# Patient Record
Sex: MALE | Race: WHITE | NOT HISPANIC OR LATINO | Employment: FULL TIME | URBAN - METROPOLITAN AREA
[De-identification: names, ages, dates, MRNs, and addresses within clinical notes are randomized per-mention and may not be internally consistent; named-entity substitution may affect disease eponyms.]

---

## 2020-01-01 ENCOUNTER — OUTSIDE FACILITY SERVICE (OUTPATIENT)
Dept: CARDIOLOGY | Facility: CLINIC | Age: 60
End: 2020-01-01

## 2020-01-01 ENCOUNTER — TELEPHONE (OUTPATIENT)
Dept: CARDIOLOGY | Facility: CLINIC | Age: 60
End: 2020-01-01

## 2020-01-01 ENCOUNTER — OFFICE VISIT (OUTPATIENT)
Dept: CARDIAC SURGERY | Facility: CLINIC | Age: 60
End: 2020-01-01

## 2020-01-01 ENCOUNTER — OFFICE VISIT (OUTPATIENT)
Dept: CARDIOLOGY | Facility: CLINIC | Age: 60
End: 2020-01-01

## 2020-01-01 VITALS
OXYGEN SATURATION: 97 % | SYSTOLIC BLOOD PRESSURE: 114 MMHG | HEART RATE: 84 BPM | WEIGHT: 242 LBS | DIASTOLIC BLOOD PRESSURE: 82 MMHG

## 2020-01-01 VITALS
HEART RATE: 86 BPM | TEMPERATURE: 97.6 F | DIASTOLIC BLOOD PRESSURE: 88 MMHG | BODY MASS INDEX: 34.99 KG/M2 | OXYGEN SATURATION: 96 % | HEIGHT: 70 IN | RESPIRATION RATE: 20 BRPM | SYSTOLIC BLOOD PRESSURE: 142 MMHG | WEIGHT: 244.4 LBS

## 2020-01-01 DIAGNOSIS — I20.8 ANGINA OF EFFORT (HCC): ICD-10-CM

## 2020-01-01 DIAGNOSIS — I48.11 LONGSTANDING PERSISTENT ATRIAL FIBRILLATION (HCC): ICD-10-CM

## 2020-01-01 DIAGNOSIS — I25.10 CAD, MULTIPLE VESSEL: Primary | ICD-10-CM

## 2020-01-01 DIAGNOSIS — I51.9 LEFT VENTRICULAR DYSFUNCTION: ICD-10-CM

## 2020-01-01 DIAGNOSIS — I10 ESSENTIAL HYPERTENSION: Primary | ICD-10-CM

## 2020-01-01 DIAGNOSIS — I48.19 PERSISTENT ATRIAL FIBRILLATION (HCC): ICD-10-CM

## 2020-01-01 DIAGNOSIS — I25.118 CORONARY ARTERY DISEASE OF NATIVE ARTERY OF NATIVE HEART WITH STABLE ANGINA PECTORIS (HCC): ICD-10-CM

## 2020-01-01 DIAGNOSIS — I25.5 ISCHEMIC CARDIOMYOPATHY: ICD-10-CM

## 2020-01-01 DIAGNOSIS — E78.2 MIXED HYPERLIPIDEMIA: ICD-10-CM

## 2020-01-01 PROCEDURE — 99254 IP/OBS CNSLTJ NEW/EST MOD 60: CPT | Performed by: INTERNAL MEDICINE

## 2020-01-01 PROCEDURE — 99232 SBSQ HOSP IP/OBS MODERATE 35: CPT | Performed by: INTERNAL MEDICINE

## 2020-01-01 PROCEDURE — 93000 ELECTROCARDIOGRAM COMPLETE: CPT | Performed by: INTERNAL MEDICINE

## 2020-01-01 PROCEDURE — 99204 OFFICE O/P NEW MOD 45 MIN: CPT | Performed by: THORACIC SURGERY (CARDIOTHORACIC VASCULAR SURGERY)

## 2020-01-01 PROCEDURE — 99214 OFFICE O/P EST MOD 30 MIN: CPT | Performed by: INTERNAL MEDICINE

## 2020-01-01 RX ORDER — NITROGLYCERIN 0.4 MG/1
TABLET SUBLINGUAL
Qty: 30 TABLET | Refills: 2 | Status: SHIPPED | OUTPATIENT
Start: 2020-01-01

## 2020-01-01 RX ORDER — METOPROLOL TARTRATE 50 MG/1
TABLET, FILM COATED ORAL
COMMUNITY
Start: 2020-01-01

## 2020-01-01 RX ORDER — DAPAGLIFLOZIN AND METFORMIN HYDROCHLORIDE 5; 1000 MG/1; MG/1
TABLET, FILM COATED, EXTENDED RELEASE ORAL
COMMUNITY
Start: 2020-01-01

## 2020-01-01 RX ORDER — HYDRALAZINE HYDROCHLORIDE 25 MG/1
TABLET, FILM COATED ORAL
COMMUNITY
Start: 2020-01-01

## 2020-01-01 RX ORDER — ISOSORBIDE MONONITRATE 30 MG/1
30 TABLET, EXTENDED RELEASE ORAL DAILY
Qty: 30 TABLET | Refills: 2 | Status: SHIPPED | OUTPATIENT
Start: 2020-01-01

## 2020-01-01 RX ORDER — DILTIAZEM HYDROCHLORIDE 240 MG/1
CAPSULE, EXTENDED RELEASE ORAL
COMMUNITY
Start: 2020-01-01

## 2020-01-01 RX ORDER — ASPIRIN 81 MG/1
81 TABLET, CHEWABLE ORAL DAILY
COMMUNITY

## 2020-01-01 RX ORDER — DOXAZOSIN MESYLATE 4 MG/1
TABLET ORAL
COMMUNITY
Start: 2020-01-01

## 2020-01-01 RX ORDER — APIXABAN 5 MG/1
TABLET, FILM COATED ORAL
COMMUNITY
Start: 2020-01-01

## 2020-02-11 NOTE — PROGRESS NOTES
Cardiology Office Visit      Encounter Date:  02/11/2020    Patient ID:   Evelin Villaseñor is a 59 y.o. male.    Reason For Followup:  Atrial fibrillation  Hypertension  Hyperlipidemia  Cardiomyopathy  Coronary artery disease    Brief Clinical History:  Dear  Provider, No Known    I had the pleasure of seeing Evelin Villaseñor today. As you are well aware, this is a 59 y.o. male with a past medical history that is significant for history of hypertension hyperlipidemia diabetes was recently hospitalized at [facility] for symptoms of atrial fibrillation with rapid ventricular response patient during the work-up noted to have a cardiomyopathy with wall motion abnormalities cardiac catheterization showed severe three-vessel coronary artery disease with prior myocardial infarction involving the LAD    Cardiac catheterization showed severe multivessel coronary artery disease mostly small vessel disease chronic total occlusion of the LAD with a left to left and right-to-left collaterals and also significant obstructive disease involving the circumflex and right coronary artery    Patient is currently on medical therapy for atrial fibrillation and also cardiomyopathy      Interval History:  Patient still has some intermittent chest discomfort and has been compliant with medical therapy    Assessment & Plan    Impressions:  Atrial fibrillation  Hypertension  Hyperlipidemia  Cardiomyopathy  Coronary artery disease    Recommendations:  Plan to add Imdur 30 mg p.o. once a day  Discontinue hydralazine  Continue current dose of metoprolol and Cardizem for rate control  Continue antiplatelet and anticoagulation therapy  Patient is scheduled to be seen and evaluated by cardiothoracic surgery this Thursday  Plans to consider coronary artery bypass surgery with maze procedure and possible ligation of the left atrial appendage  Risk benefits and alternatives and further treatment options discussed with the patient and  family  Further recommendations based on input from surgical team  Follow-up in office in 1 month    Objective:    Vitals:  Vitals:    02/11/20 0858   BP: 114/82   Pulse: 84   SpO2: 97%   Weight: 110 kg (242 lb)       Physical Exam:    General: Alert, cooperative, no distress, appears stated age  Head:  Normocephalic, atraumatic, mucous membranes moist  Eyes:  Conjunctiva/corneas clear, EOM's intact     Neck:  Supple,  no adenopathy;      Lungs: Clear to auscultation bilaterally, no wheezes rhonchi rales are noted  Chest wall: No tenderness  Heart::  Regular rate and rhythm, S1 and S2 normal, no murmur, rub or gallop  Abdomen: Soft, non-tender, nondistended bowel sounds active  Extremities: No cyanosis, clubbing, or edema  Pulses: 2+ and symmetric all extremities  Skin:  No rashes or lesions  Neuro/psych: A&O x3. CN II through XII are grossly intact with appropriate affect      Allergies:  No Known Allergies    Medication Review:     Current Outpatient Medications:   •  aspirin 81 MG chewable tablet, Chew 81 mg Daily., Disp: , Rfl:   •  dilTIAZem (TIAZAC) 240 MG 24 hr capsule, , Disp: , Rfl:   •  doxazosin (CARDURA) 4 MG tablet, , Disp: , Rfl:   •  ELIQUIS 5 MG tablet tablet, , Disp: , Rfl:   •  hydrALAZINE (APRESOLINE) 25 MG tablet, , Disp: , Rfl:   •  insulin aspart (NOVOLOG) 100 UNIT/ML injection, Inject  under the skin into the appropriate area as directed 3 (Three) Times a Day Before Meals., Disp: , Rfl:   •  metoprolol tartrate (LOPRESSOR) 50 MG tablet, , Disp: , Rfl:   •  Semaglutide (RYBELSUS) 14 MG tablet, Take  by mouth. q day, Disp: , Rfl:   •  XIGDUO XR 5-1000 MG tablet, , Disp: , Rfl:     Family History:  Family History   Problem Relation Age of Onset   • Heart disease Mother        Past Medical History:  Past Medical History:   Diagnosis Date   • Diabetes mellitus (CMS/HCC)    • Hypertension        Past surgical History:  Past Surgical History:   Procedure Laterality Date   • CARDIAC CATHETERIZATION      • KNEE SURGERY     • SHOULDER SURGERY     • VEIN SURGERY         Social History:  Social History     Socioeconomic History   • Marital status:      Spouse name: Not on file   • Number of children: Not on file   • Years of education: Not on file   • Highest education level: Not on file   Tobacco Use   • Smoking status: Never Smoker   • Smokeless tobacco: Never Used   Substance and Sexual Activity   • Alcohol use: Yes     Comment: weekly    • Drug use: Never       Review of Systems:  The following systems were reviewed as they relate to the cardiovascular system: Constitutional, Eyes, ENT, Cardiovascular, Respiratory, Gastrointestinal, Integumentary, Neurological, Psychiatric, Hematologic, Endocrine, Musculoskeletal, and Genitourinary. The pertinent cardiovascular findings are reported above with all other cardiovascular points within those systems being negative.    Diagnostic Study Review:     Current Electrocardiogram:    ECG 12 Lead  Date/Time: 2/11/2020 5:50 PM  Performed by: Brian Rodriguez MD  Authorized by: Brian Rodriguez MD   Comparison: compared with previous ECG   Similar to previous ECG  Rhythm: atrial fibrillation  Rate: normal  BPM: 84  Conduction: non-specific intraventricular conduction delay  QRS axis: normal  Other findings: non-specific ST-T wave changes    Clinical impression: abnormal EKG              NOTE: The following portions of the patient's history were reviewed and updated this visit as appropriate: allergies, current medications, past family history, past medical history, past social history, past surgical history and problem list.

## 2020-02-16 PROBLEM — I20.8 ANGINA OF EFFORT (HCC): Status: ACTIVE | Noted: 2020-01-01

## 2020-02-16 PROBLEM — I48.19 PERSISTENT ATRIAL FIBRILLATION (HCC): Status: ACTIVE | Noted: 2020-01-01

## 2020-02-16 PROBLEM — I25.10 CAD, MULTIPLE VESSEL: Status: ACTIVE | Noted: 2020-01-01

## 2020-02-16 PROBLEM — I51.9 LEFT VENTRICULAR DYSFUNCTION: Status: ACTIVE | Noted: 2020-01-01

## 2020-02-16 NOTE — PROGRESS NOTES
2/16/2020    Seen on 2/13/20    Chief Complaint     Angina pectoris    History of Present Illness:       Dear Dr. Rodriguez and Colleagues,  It was nice to see Evelin SANTANA Kasi in consultation at your request. He is a 59 y.o. male with a history of hypertension, diabetes, osteoarthritis, status post left knee replacement, and recent admission for new onset of atrial fibrillation and progressive chest pain.  He had no history of coronary artery disease until recently when he was admitted with midsternal chest pain and new onset of atrial fibrillation.  Chest pain was substernal, central chest and radiated to the upper abdomen.  He was dull and it was associated with pressure and tightness.  He will need 30 and he consulted the emergency room because his heart rate was in the 130s.  He was treated with diltiazem with good improvement however is not clear what he was converted.  He had degree of congestive heart failure and he was treated appropriately.  His creatinine level was slightly elevated.  He was placed on Eliquis and after the stabilization eventually went home.  His chest pain has gotten better since he is taking nitroglycerin orally.  While he exerts, he still has low level angina . He denies dyspnea, orthopnea, PND, lower extremity edema, TIA or stroke.  He had mild fatigue and dyspnea associated with chest discomfort or palpitations.  He had a cardiac cath that showed severe diffuse three-vessel coronary artery disease with mild depression in the LV function, approximately 45%.  His LAD is no bypassable and the diagonals are small in size.  Multiple tandem lesions.  There are 2 OM coronaries, 1 of them small that is probably bypassable the other one is too small.  The RCA has a proximal lesion and he has distal disease.  He is unclear what he has family history of coronary artery disease.  He had an echocardiogram that showed an ejection fraction 45% with mild to moderate inferior wall hypokinesis.  There is  no mitral regurgitation..      Patient Active Problem List   Diagnosis   • CAD, multiple vessel   • Angina of effort (CMS/HCC)   • Left ventricular dysfunction   • Persistent atrial fibrillation       Past Medical History:   Diagnosis Date   • Diabetes mellitus (CMS/HCC)    • Hypertension        Past Surgical History:   Procedure Laterality Date   • CARDIAC CATHETERIZATION     • KNEE SURGERY     • SHOULDER SURGERY     • VEIN SURGERY         No Known Allergies      Current Outpatient Medications:   •  aspirin 81 MG chewable tablet, Chew 81 mg Daily., Disp: , Rfl:   •  dilTIAZem (TIAZAC) 240 MG 24 hr capsule, , Disp: , Rfl:   •  doxazosin (CARDURA) 4 MG tablet, , Disp: , Rfl:   •  ELIQUIS 5 MG tablet tablet, , Disp: , Rfl:   •  hydrALAZINE (APRESOLINE) 25 MG tablet, , Disp: , Rfl:   •  insulin aspart (NOVOLOG) 100 UNIT/ML injection, Inject  under the skin into the appropriate area as directed 3 (Three) Times a Day Before Meals., Disp: , Rfl:   •  isosorbide mononitrate (IMDUR) 30 MG 24 hr tablet, Take 1 tablet by mouth Daily., Disp: 30 tablet, Rfl: 2  •  metoprolol tartrate (LOPRESSOR) 50 MG tablet, , Disp: , Rfl:   •  nitroglycerin (NITROSTAT) 0.4 MG SL tablet, 1 under the tongue as needed for angina, may repeat q5mins for up three doses, Disp: 30 tablet, Rfl: 2  •  Semaglutide (RYBELSUS) 14 MG tablet, Take  by mouth. q day, Disp: , Rfl:   •  XIGDUO XR 5-1000 MG tablet, , Disp: , Rfl:     Social History     Socioeconomic History   • Marital status:      Spouse name: Not on file   • Number of children: Not on file   • Years of education: Not on file   • Highest education level: Not on file   Tobacco Use   • Smoking status: Never Smoker   • Smokeless tobacco: Never Used   Substance and Sexual Activity   • Alcohol use: Yes     Comment: weekly    • Drug use: Never       Family History   Problem Relation Age of Onset   • Heart disease Mother      Review of Systems   Constitutional: Positive for fatigue.    Respiratory: Positive for apnea and shortness of breath.    Cardiovascular: Positive for chest pain and palpitations. Negative for leg swelling.   Genitourinary: Negative for dysuria and flank pain.   Neurological: Negative for dizziness, syncope and weakness.   All other systems reviewed and are negative.      Physical Exam:    Vital Signs:  Weight: 111 kg (244 lb 6.4 oz)   Body mass index is 35.07 kg/m².  Temp: 97.6 °F (36.4 °C)   Heart Rate: 86   BP: 142/88     Physical Exam   Constitutional: He is oriented to person, place, and time. He appears well-developed and well-nourished.   HENT:   Head: Normocephalic and atraumatic.   Nose: Nose normal.   Mouth/Throat: Oropharynx is clear and moist.   Eyes: Pupils are equal, round, and reactive to light. Conjunctivae are normal.   Neck: Normal range of motion. Neck supple. No JVD present. No thyromegaly present.   Cardiovascular: Normal rate, normal heart sounds and intact distal pulses. An irregular rhythm present. PMI is not displaced. Exam reveals no gallop and no friction rub.   No murmur heard.  Pulses:       Radial pulses are 2+ on the right side, and 2+ on the left side.        Posterior tibial pulses are 2+ on the right side, and 2+ on the left side.   Pulmonary/Chest: Effort normal and breath sounds normal. He has no rales.   Abdominal: Soft. Bowel sounds are normal. He exhibits no distension and no mass. There is no tenderness.   Musculoskeletal: Normal range of motion. He exhibits no tenderness or deformity.   Lymphadenopathy:     He has no cervical adenopathy.   Neurological: He is alert and oriented to person, place, and time. He has normal reflexes.   Skin: Skin is warm and dry. No rash noted. No erythema.   Psychiatric: He has a normal mood and affect. His behavior is normal.   No groin or neck adenopathy     Assessment:     Problem List Items Addressed This Visit        Cardiovascular and Mediastinum    CAD, multiple vessel - Primary    Angina of effort  (CMS/[facility])    Left ventricular dysfunction    Persistent atrial fibrillation          1. Severe multivessel coronary artery disease  2. Angina of effort  3. Diabetes and hypertension  4. Atrial fibrillation on Eliquis  5. LV dysfunction    Recommendation/Plan:     1. In terms of his coronary artery disease he has diffuse disease which I believe is not amenable for bypass.  I do not justify an operation to only  bypass 1 moderate size OM.  He feels well of the left ventricle is hypokinetic and there is no target in that area.  He is doing better with medical management using nitroglycerin and beta-blockers.  I recommend medical management with Cardiologic follow-up  2. In terms of his atrial fibrillation it is unclear to me whether he is on a persistent irregular rhythm.  Seems that he may be converting and he may have some PACs.  He is on Eliquis and antiarrhythmic medications.  He may need cardioversion or an ablation if he stays in a persistent irregular rhythm  3. LV dysfunction.  He needs close management to prevent congestive heart failure.    Thank you for allowing me to participate in his care.    Regards,    Tian Ryan M.D.